# Patient Record
Sex: MALE | Race: BLACK OR AFRICAN AMERICAN | NOT HISPANIC OR LATINO | URBAN - NONMETROPOLITAN AREA
[De-identification: names, ages, dates, MRNs, and addresses within clinical notes are randomized per-mention and may not be internally consistent; named-entity substitution may affect disease eponyms.]

---

## 2017-03-07 PROBLEM — H53.022: Noted: 2017-03-07

## 2017-03-07 PROBLEM — H52.03: Noted: 2017-03-07

## 2021-09-02 ENCOUNTER — IMPORTED ENCOUNTER (OUTPATIENT)
Dept: URBAN - NONMETROPOLITAN AREA CLINIC 1 | Facility: CLINIC | Age: 56
End: 2021-09-02

## 2021-09-02 PROCEDURE — S0620 ROUTINE OPHTHALMOLOGICAL EXA: HCPCS

## 2021-09-02 NOTE — PATIENT DISCUSSION
Hyperopia-Discussed diagnosis with patient. REFRACTIVE AMBLYOPIA OSUpdated spec Rx given. Recommend lens that will provide comfort as well as protect safety and health of eyes.

## 2022-04-10 ASSESSMENT — TONOMETRY
OD_IOP_MMHG: 16
OS_IOP_MMHG: 16

## 2022-04-10 ASSESSMENT — VISUAL ACUITY
OD_CC: J1
OD_SC: 20/20
OS_SC: 20/50
OS_CC: J1

## 2025-06-17 ENCOUNTER — NEW PATIENT (OUTPATIENT)
Age: 60
End: 2025-06-17

## 2025-06-17 DIAGNOSIS — H25.13: ICD-10-CM

## 2025-06-17 PROCEDURE — 92004 COMPRE OPH EXAM NEW PT 1/>: CPT
